# Patient Record
Sex: FEMALE | Race: WHITE | Employment: FULL TIME | URBAN - METROPOLITAN AREA
[De-identification: names, ages, dates, MRNs, and addresses within clinical notes are randomized per-mention and may not be internally consistent; named-entity substitution may affect disease eponyms.]

---

## 2020-04-08 ENCOUNTER — INITIAL PRENATAL (OUTPATIENT)
Dept: OBGYN CLINIC | Age: 35
End: 2020-04-08
Payer: COMMERCIAL

## 2020-04-08 VITALS
TEMPERATURE: 99.1 F | DIASTOLIC BLOOD PRESSURE: 69 MMHG | BODY MASS INDEX: 35.13 KG/M2 | SYSTOLIC BLOOD PRESSURE: 107 MMHG | WEIGHT: 211.13 LBS | HEART RATE: 103 BPM

## 2020-04-08 PROBLEM — Z3A.21 21 WEEKS GESTATION OF PREGNANCY: Status: ACTIVE | Noted: 2020-04-08

## 2020-04-08 PROCEDURE — 81002 URINALYSIS NONAUTO W/O SCOPE: CPT | Performed by: OBSTETRICS & GYNECOLOGY

## 2020-04-08 PROCEDURE — 99213 OFFICE O/P EST LOW 20 MIN: CPT | Performed by: OBSTETRICS & GYNECOLOGY

## 2020-04-08 PROCEDURE — 99999 PR OFFICE/OUTPT VISIT,PROCEDURE ONLY: CPT | Performed by: OBSTETRICS & GYNECOLOGY

## 2020-04-08 PROCEDURE — 76811 OB US DETAILED SNGL FETUS: CPT | Performed by: OBSTETRICS & GYNECOLOGY

## 2020-05-08 ENCOUNTER — TELEPHONE (OUTPATIENT)
Dept: OBGYN CLINIC | Age: 35
End: 2020-05-08

## 2020-06-01 ENCOUNTER — ROUTINE PRENATAL (OUTPATIENT)
Dept: OBGYN CLINIC | Age: 35
End: 2020-06-01
Payer: COMMERCIAL

## 2020-06-01 VITALS
BODY MASS INDEX: 36.86 KG/M2 | DIASTOLIC BLOOD PRESSURE: 75 MMHG | HEART RATE: 104 BPM | SYSTOLIC BLOOD PRESSURE: 125 MMHG | TEMPERATURE: 97.5 F | WEIGHT: 221.5 LBS

## 2020-06-01 PROBLEM — Z3A.28 28 WEEKS GESTATION OF PREGNANCY: Status: ACTIVE | Noted: 2020-06-01

## 2020-06-01 PROBLEM — O44.03 PLACENTA PREVIA ANTEPARTUM IN THIRD TRIMESTER: Status: ACTIVE | Noted: 2020-06-01

## 2020-06-01 PROBLEM — Z3A.21 21 WEEKS GESTATION OF PREGNANCY: Status: RESOLVED | Noted: 2020-04-08 | Resolved: 2020-06-01

## 2020-06-01 PROCEDURE — 76817 TRANSVAGINAL US OBSTETRIC: CPT | Performed by: OBSTETRICS & GYNECOLOGY

## 2020-06-01 PROCEDURE — 76816 OB US FOLLOW-UP PER FETUS: CPT | Performed by: OBSTETRICS & GYNECOLOGY

## 2020-06-01 PROCEDURE — 99241 PR OFFICE CONSULTATION NEW/ESTAB PATIENT 15 MIN: CPT | Performed by: OBSTETRICS & GYNECOLOGY

## 2020-06-01 PROCEDURE — 99213 OFFICE O/P EST LOW 20 MIN: CPT | Performed by: OBSTETRICS & GYNECOLOGY

## 2020-06-01 ASSESSMENT — ENCOUNTER SYMPTOMS
RESPIRATORY NEGATIVE: 1
GASTROINTESTINAL NEGATIVE: 1
EYES NEGATIVE: 1

## 2020-06-01 NOTE — PROGRESS NOTES
MATERNAL-FETAL MEDICINE CONSULT    Referring/Requesting Provider: Doe Lilly MD       HISTORY OF PRESENT ILLNESS:   Patient is a 37 y.o. [id number] at 28w3d who presents for consultation regarding placenta previa. She denies contractions or vaginal bleeding. 3 prior term vaginal deliveries. She is currently not working as her OB took her off from work. She overall feels well without concerns. OB History    Para Term  AB Living   5 3 3 0 1 3   SAB TAB Ectopic Molar Multiple Live Births   0 0 0   0 3      # Outcome Date GA Lbr [place]/2nd Weight Sex Delivery Anes PTL Lv   5 Current            4 Term 02/18/15 39w2d  7 lb 1 oz (3.204 kg) M Vag-Spont  N ERIKA   3 AB  6w0d          2 Term 12 37w0d  6 lb 4 oz (2.835 kg) F Vag-Spont EPI N ERIKA      Birth Comments: Induced labor due to low fluid levels   1 Term 05/10/09 40w5d  6 lb 13 oz (3.09 kg) F Vag-Spont EPI N ERIKA       Past Medical History:   Diagnosis Date    Abnormal Pap smear     Depression     Post partum depression with her last preagnancy as well.  n othing recent    Postpartum depression        Past Surgical History:   Procedure Laterality Date    INDUCED   2014       Family History   Problem Relation Age of Onset    Heart Attack Paternal Grandfather     Heart Attack Father     Hypertension Father     Cancer Mother        Current Outpatient Medications on File Prior to Visit   Medication Sig Dispense Refill    Prenatal Vit-Fe Fumarate-FA (PRENATAL 1 PLUS 1 PO) Take  by mouth. No current facility-administered medications on file prior to visit. Allergies   Allergen Reactions    Phenergan [Promethazine Hcl]        Review of Systems   Constitutional: Negative. HENT: Negative. Eyes: Negative. Respiratory: Negative. Cardiovascular: Negative. Gastrointestinal: Negative. Endocrine: Negative.     Genitourinary: Negative for difficulty urinating, pelvic pain, vaginal bleeding, vaginal discharge and vaginal pain. Gravid   Musculoskeletal: Negative. Skin: Negative. Psychiatric/Behavioral: Negative. PHYSICAL EXAM:  VITAL SIGNS: /75   Pulse 104   Temp 97.5 °F (36.4 °C) (Oral)   Wt 221 lb 8 oz (100.5 kg)   LMP 11/15/2019   BMI 36.86 kg/m²   AAOx2, NAD  Resp effort normal    IMAGING:  See report    LABS:  None      IMPRESSION AND RECOMMENDATIONS:  Trupti Joe is a 37 y.o. [id number] at 28w3d with  Patient Active Problem List    Diagnosis Date Noted    28 weeks gestation of pregnancy 2020    Placenta previa antepartum in third trimester 2020     Overview Note:      Diagnosis of placenta previa was reviewed with Trupti Joe.  Recommend pelvic rest (no intercourse, no digital cervical exam).  Bleeding precautions reviewed.   delivery between 39 0/7  and 37 6/7 weeks gestation is recommended for stable placenta previa.  High-risk pregnancy 2012    Depression      Overview Note:     Post partum depression with her last preagnancy as well. Follow up with MFM in 4 weeks to reassess fetal biometry and placental location. The total patient time of the visit was 15 minutes, of which was greater than 50% of the time was spent counseling and coordinating care.

## 2020-06-01 NOTE — PATIENT INSTRUCTIONS
is just below the rib cage and above the waist on either side of the back. ? Blood in your urine. Watch closely for changes in your health, and be sure to contact your doctor if:  · You have vaginal discharge that smells bad. · You have skin changes, such as:  ? A rash. ? Itching. ? Yellow color to your skin. · You have other concerns about your pregnancy. If you have labor signs at 37 weeks or more  If you have signs of labor at 37 weeks or more, your doctor may tell you to call when your labor becomes more active. Symptoms of active labor include:  · Contractions that are regular. · Contractions that are less than 5 minutes apart. · Contractions that are hard to talk through. Follow-up care is a key part of your treatment and safety. Be sure to make and go to all appointments, and call your doctor if you are having problems. It's also a good idea to know your test results and keep a list of the medicines you take. Where can you learn more? Go to https://[email or web address].[email or web address]. org and sign in to your Hangtime account. Enter  in the Greysox box to learn more about \"Learning About When to Call Your Doctor During Pregnancy (After 20 Weeks). \"     If you do not have an account, please click on the \"Sign Up Now\" link. Current as of: February 11, 2020               Content Version: 12.5  © 4036-4089 Healthwise, Incorporated. Care instructions adapted under license by [facility] ([facility]). If you have questions about a medical condition or this instruction, always ask your healthcare professional. [place] [address] any warranty or liability for your use of this information. Patient Education        Counting Your Baby's Kicks: Care Instructions  Your Care Instructions     Counting your baby's kicks is one way your doctor can tell that your baby is healthy. Most women--especially in a first pregnancy--feel their baby move for the first time between 16 and 22 weeks.

## 2020-06-01 NOTE — LETTER
[address] [facility] FETAL MEDICINE  [address]   Dept: [phone]  Loc: [phone]  Nazario Gibbs DO                                           [facility] Consultation Letter     20     Breanne Dumont MD     Re: Patient: Gareth Bales  YOB: 1985    MR Number: [id number] Date of Visit: 2020     Dear Dr. Bisi Velazquez had the pleasure of seeing your patient, Gareth Bales, in consultation in the [facility] Fetal Medicine Department of [facility] ([facility]). MATERNAL-FETAL MEDICINE CONSULT    Referring/Requesting Provider: Breanne Dumont MD       HISTORY OF PRESENT ILLNESS:   Patient is a 37 y.o. [id number] at 28w3d who presents for consultation regarding placenta previa. She denies contractions or vaginal bleeding. 3 prior term vaginal deliveries. She is currently not working as her OB took her off from work. She overall feels well without concerns.     OB History    Para Term  AB Living   5 3 3 0 1 3   SAB TAB Ectopic Molar Multiple Live Births   0 0 0   0 3      # Outcome Date GA Lbr Everton/2nd Weight Sex Delivery Anes PTL Lv   5 Current            4 Term 02/18/15 39w2d  7 lb 1 oz (3.204 kg) M Vag-Spont  N ERIKA   3 AB  6w0d          2 Term 12 37w0d  6 lb 4 oz (2.835 kg) F Vag-Spont EPI N ERIKA      Birth Comments: Induced labor due to low fluid levels   1 Term 05/10/09 40w5d  6 lb 13 oz (3.09 kg) F Vag-Spont EPI N ERIKA       Past Medical History:   Diagnosis Date    Abnormal Pap smear     Depression     Post partum depression with her last preagnancy as well.  n othing recent    Postpartum depression        Past Surgical History:   Procedure Laterality Date    INDUCED   2014       Family History   Problem Relation Age of Onset    Heart Attack Paternal Grandfather     Heart Attack Father     Hypertension Father     Cancer Mother        Current Outpatient Medications on File Prior to Visit   Medication Sig Dispense Refill  Prenatal Vit-Fe Fumarate-FA (PRENATAL 1 PLUS 1 PO) Take  by mouth. No current facility-administered medications on file prior to visit. Allergies   Allergen Reactions    Phenergan [Promethazine Hcl]        Review of Systems   Constitutional: Negative. HENT: Negative. Eyes: Negative. Respiratory: Negative. Cardiovascular: Negative. Gastrointestinal: Negative. Endocrine: Negative. Genitourinary: Negative for difficulty urinating, pelvic pain, vaginal bleeding, vaginal discharge and vaginal pain. Gravid   Musculoskeletal: Negative. Skin: Negative. Psychiatric/Behavioral: Negative. PHYSICAL EXAM:  VITAL SIGNS: /75   Pulse 104   Temp 97.5 °F (36.4 °C) (Oral)   Wt 221 lb 8 oz (100.5 kg)   LMP 11/15/2019   BMI 36.86 kg/m²    AAOx2, NAD  Resp effort normal    IMAGING:  See report    LABS:  None      IMPRESSION AND RECOMMENDATIONS:  Ashley Caicedo is a 37 y.o. [id number] at 28w3d with  Patient Active Problem List    Diagnosis Date Noted    28 weeks gestation of pregnancy 2020    Placenta previa antepartum in third trimester 2020     Overview Note:     ? Diagnosis of placenta previa was reviewed with Nesteffrakesh . ? Recommend pelvic rest (no intercourse, no digital cervical exam). ? Bleeding precautions reviewed. ?  delivery between 36 0/7  and 37 6/7 weeks gestation is recommended for stable placenta previa.  High-risk pregnancy 2012    Depression      Overview Note:     Post partum depression with her last preagnancy as well. Follow up with MFM in 4 weeks to reassess fetal biometry and placental location. The total patient time of the visit was 15 minutes, of which was greater than 50% of the time was spent counseling and coordinating care. Thank you for allowing us to participate in the care of your patient.  Should you or the family have any questions or concerns, please do not hesitate to contact us.     Sincerely,    Ryann Bobby, DO

## 2020-07-20 ENCOUNTER — ROUTINE PRENATAL (OUTPATIENT)
Dept: OBGYN CLINIC | Age: 35
End: 2020-07-20
Payer: COMMERCIAL

## 2020-07-20 VITALS
WEIGHT: 233 LBS | DIASTOLIC BLOOD PRESSURE: 79 MMHG | TEMPERATURE: 98.2 F | BODY MASS INDEX: 38.77 KG/M2 | HEART RATE: 97 BPM | SYSTOLIC BLOOD PRESSURE: 115 MMHG

## 2020-07-20 PROCEDURE — 76819 FETAL BIOPHYS PROFIL W/O NST: CPT | Performed by: OBSTETRICS & GYNECOLOGY

## 2020-07-20 PROCEDURE — 99213 OFFICE O/P EST LOW 20 MIN: CPT | Performed by: OBSTETRICS & GYNECOLOGY

## 2020-07-20 PROCEDURE — 76816 OB US FOLLOW-UP PER FETUS: CPT | Performed by: OBSTETRICS & GYNECOLOGY

## 2020-07-20 PROCEDURE — 81002 URINALYSIS NONAUTO W/O SCOPE: CPT | Performed by: OBSTETRICS & GYNECOLOGY

## 2020-07-20 PROCEDURE — 76817 TRANSVAGINAL US OBSTETRIC: CPT | Performed by: OBSTETRICS & GYNECOLOGY

## 2020-07-20 NOTE — PROGRESS NOTES
[facility] FETAL MEDICINE  [address] [place] [address].  [facility] [place], [place]. [address]  Ph: [phone]    Fax: [phone]   July 20, 2020    RE: Selena Lay  [birth year]  Dear Dr. Renee Cox: Thank you for sending   Ms. Murillo   for consultation and ultrasound  in our office   on  7.20.2020. REASON FOR CONSULTATION:    Low Lying placenta/ resolving previa, Hx of adherent placenta x 2-; Sleep apnea , Advanced Maternal Age    Her chart was reviewed, her medical, surgical , and OBG history was examined along with any supporting documents available to me .  Her recent clinical visits and notes were reviewed.  Her recent laboratory and pathology  testing was reviewed  Review of Systems :  cant sleep- feel terrible    CONSTITUTIONAL : No fever, no chills    HEENT : No headache, no visual changes, no sore throat    GASTROINTESTINAL : No N/V, no D/C, no abdominal pain    GENITOURINARY : No dysuria, no vaginal bleeding or fluid leaking or discharge    She reports good fetal movement   PHYSICAL EXAMINATION:   General Appearance: Healthy looking, alert , no acute distress.  Eyes: No pallor, no icterus, no photophobia.  Back: No CVA tenderness.  Abdomen: Soft, non-tender.  Extremities: 3+ pretibial pitting edema    An ultrasound evaluation was done today. Please refer to the enclosed copy of the ultrasound report for further detailed information. ULTRASOUND IMPRESSION:    ? Within developmental and technical limits of ultrasound assessment,   ? Vertex @ 35w3d  ? Active, responsive baby. The amniotic fluid volume appears normal.   ? The fetus is measuring appropriate for gestational age. ? There has been good interval growth and development . ? Transvaginal views of placenta appears to be clear of cervix. Posterior, very low lying placenta   ? The biophysical profile is reassuring with a score of 8/8.   ? Patient notes fetal movement, no cramping or contraction.  No undue tenderness or distress during exam.   DISCUSSION : Overall favorable report today.  The risk of bleeding increases as the distance between the placental edge and internal os decreases and if vasa previa is present.  The optimal route for delivery of pregnancies where the distance between the placental edge and internal os is 0 to 20 mm is debatable. In these cases, the fetal head may tamponade the adjacent placenta, thus preventing hemorrhage.  If a trial of labor with a low-lying placenta is attempted, the facility should have the ability to perform an emergency  delivery.  The frequency of emergency  delivery due to hemorrhage was 45 % @ (0-10mm) , 14% @11-20mm, and 10 % >20mm, respectively.   delivery at 36+0 to 37+6 weeks in pregnancies with uncomplicated placenta previa, in agreement with recommendations of the [employer] of Obstetricians and Gynecologists and the Society for Maternal-Fetal Medicine    RECOMMENDATIONS:  1. The patient is to continue to follow with you in your office for ongoing obstetric care. 2. In light of very low lying placenta with previa concerns noted earlier- recommend planned  @ 36w. 3. Recommend anticipated possible hemorrhage precautions- with instrumentation, consents, Type and cross, medications available. 4. She is to call if she has any problems or questions prior to her next visit. 5. Further evaluation and management will be dependent on her clinical presentation and the results of her testing. Once again, thank you for allowing us to participate in the care of this patient and if we can be of any further assistance to you, please do not hesitate to contact us. Sincerely,      Houston Courser, MD  I spent 16 minutes with the patient of which greater than 50% of the time was used to  the patient, discuss complications and problems related to her pregnancy, or coordinating her care.  I answered all of her questions to her

## 2020-07-23 LAB
GLUCOSE URINE, POC: NORMAL
PROTEIN UA: NEGATIVE

## 2023-02-20 PROBLEM — E66.01 CLASS 2 SEVERE OBESITY DUE TO EXCESS CALORIES WITH SERIOUS COMORBIDITY AND BODY MASS INDEX (BMI) OF 39.0 TO 39.9 IN ADULT (MULTI): Status: ACTIVE | Noted: 2023-02-20

## 2023-02-20 PROBLEM — F17.210 CIGARETTE NICOTINE DEPENDENCE WITHOUT COMPLICATION: Status: ACTIVE | Noted: 2023-02-20

## 2023-02-20 PROBLEM — E55.9 VITAMIN D DEFICIENCY: Status: ACTIVE | Noted: 2023-02-20

## 2023-02-20 PROBLEM — R53.83 FATIGUE: Status: ACTIVE | Noted: 2023-02-20

## 2023-02-20 PROBLEM — E78.5 DYSLIPIDEMIA: Status: ACTIVE | Noted: 2023-02-20

## 2023-02-20 PROBLEM — E04.9 ENLARGED THYROID: Status: ACTIVE | Noted: 2023-02-20

## 2023-02-20 PROBLEM — R53.82 CHRONIC FATIGUE: Status: ACTIVE | Noted: 2023-02-20

## 2023-02-20 PROBLEM — F41.9 ANXIETY: Status: ACTIVE | Noted: 2023-02-20

## 2023-02-20 PROBLEM — G47.33 OSA (OBSTRUCTIVE SLEEP APNEA): Status: ACTIVE | Noted: 2023-02-20

## 2023-02-20 RX ORDER — DULAGLUTIDE 0.75 MG/.5ML
0.75 INJECTION, SOLUTION SUBCUTANEOUS
COMMUNITY
End: 2023-03-14

## 2023-02-20 RX ORDER — BUPROPION HYDROCHLORIDE 200 MG/1
300 TABLET, EXTENDED RELEASE ORAL 2 TIMES DAILY
COMMUNITY

## 2023-03-14 ENCOUNTER — PROCEDURE VISIT (OUTPATIENT)
Dept: PRIMARY CARE | Facility: CLINIC | Age: 38
End: 2023-03-14
Payer: COMMERCIAL

## 2023-03-14 VITALS
WEIGHT: 230.4 LBS | DIASTOLIC BLOOD PRESSURE: 79 MMHG | OXYGEN SATURATION: 98 % | HEART RATE: 108 BPM | HEIGHT: 65 IN | RESPIRATION RATE: 20 BRPM | SYSTOLIC BLOOD PRESSURE: 115 MMHG | BODY MASS INDEX: 38.39 KG/M2

## 2023-03-14 DIAGNOSIS — L91.8 SKIN TAGS, MULTIPLE ACQUIRED: ICD-10-CM

## 2023-03-14 DIAGNOSIS — R53.82 CHRONIC FATIGUE: ICD-10-CM

## 2023-03-14 DIAGNOSIS — E55.9 VITAMIN D DEFICIENCY: ICD-10-CM

## 2023-03-14 DIAGNOSIS — E78.5 DYSLIPIDEMIA: Primary | ICD-10-CM

## 2023-03-14 DIAGNOSIS — F41.9 ANXIETY: ICD-10-CM

## 2023-03-14 DIAGNOSIS — D72.828 OTHER ELEVATED WHITE BLOOD CELL (WBC) COUNT: ICD-10-CM

## 2023-03-14 DIAGNOSIS — E04.9 ENLARGED THYROID: ICD-10-CM

## 2023-03-14 DIAGNOSIS — E66.01 CLASS 2 SEVERE OBESITY DUE TO EXCESS CALORIES WITH SERIOUS COMORBIDITY AND BODY MASS INDEX (BMI) OF 38.0 TO 38.9 IN ADULT (MULTI): ICD-10-CM

## 2023-03-14 DIAGNOSIS — G47.33 OSA (OBSTRUCTIVE SLEEP APNEA): ICD-10-CM

## 2023-03-14 PROCEDURE — 99214 OFFICE O/P EST MOD 30 MIN: CPT | Performed by: FAMILY MEDICINE

## 2023-03-14 PROCEDURE — 11200 RMVL SKIN TAGS UP TO&INC 15: CPT | Performed by: FAMILY MEDICINE

## 2023-03-14 RX ORDER — UBIDECARENONE 75 MG
1000 CAPSULE ORAL DAILY
Qty: 60 TABLET | Refills: 2 | Status: SHIPPED | OUTPATIENT
Start: 2023-03-14 | End: 2023-06-08

## 2023-03-14 RX ORDER — DULAGLUTIDE 1.5 MG/.5ML
1.5 INJECTION, SOLUTION SUBCUTANEOUS
Qty: 4 EACH | Refills: 0 | Status: SHIPPED | OUTPATIENT
Start: 2023-03-14 | End: 2023-04-14

## 2023-03-14 RX ORDER — ERGOCALCIFEROL 1.25 MG/1
50000 CAPSULE ORAL
Qty: 4 CAPSULE | Refills: 2 | Status: SHIPPED | OUTPATIENT
Start: 2023-03-14 | End: 2023-05-16 | Stop reason: ALTCHOICE

## 2023-03-14 ASSESSMENT — PAIN SCALES - GENERAL: PAINLEVEL: 0-NO PAIN

## 2023-03-14 NOTE — PROGRESS NOTES
Subjective     Pam Purcell is a 37 y.o. female who presents for No chief complaint on file..      HPI  The patient is a 37 year-old female presenting to the clinic for a skin tag removal and to discuss medications.    Follow-up.  Have reviewed lab results.  Educated on dyslipidemia and diet exercise.  Complaining feeling tired.  Advised on diet exercise.  History of anxiety.  Denies depression.  Denies suicidal.  Denies homicidal.  Keep up on the specialist.  Educated on vitamin D deficiency.  Educated on obesity and diet exercise.  Advised to lose weight.  Has been watching diet has been exercising.  Has been trying to lose weight.  I have reviewed ultrasound results and blood work results.  Discussed about enlarged thyroid.  We will continue monitor.  Educated elevated blood cell count.  Does not wish to go see the hematologist we will continue monitor.  Educated on obstructive sleep apnea.  Sleep hygiene.  Has multiple skin tags around the neck.  14 skin tags were noted.  After she gave verbal consent I have proceeded with skin tag removal.  Under aseptic under complete aseptic precautions skin tags were removed with iris scissors.  She had tolerated procedure very well.  Postprocedure instructions were given.  Bleeding was scanty and controlled with the silver nitrate cautery        Review of Systems  Review of systems    General.  Denies fever.  Denies chills.    HEENT denies nasal congestion.  Denies sinus pressure.    Respiratory.  Denies cough.  Denies shortness of breath.    Cardiovascular.  Denies chest pain.  Denies heart palpitations.  Denies shortness of breath.    Gastrointestinal.  Denies nausea vomiting diarrhea.  Denies abdominal pain.    Genitourinary denies burning urination.  Denies frequent urination.  Denies flank pain.  Denies blood in the urine.  Denies abnormal vaginal discharge.    Neurology.  Denies tingling numbness but denies weakness.  Denies headache.  Denies blurred  "vision.    Musculoskeletal.  Denies body aches.  Denies joint pains.  Denies muscle aches.  Denies muscle weakness    Endocrinology.  Denies cold intolerance.  Denies hot intolerance.    Psychiatric.  Denies depression.  Denies anxiety.  Denies suicidal.  Denies homicidal.  Skin.  Dictated as above    Objective       3/14/2023     2:25 PM   Vitals   Systolic 115   Diastolic 79   Heart Rate 108   Resp 20   Height (in) 1.651 m (5' 5\")   Weight (lb) 230.4   BMI 38.34 kg/m2   BSA (m2) 2.19 m2       Physical Exam  General.  Not in distress.  HEENT normocephalic anicteric sclerae.  Neck soft supple noted thyromegaly.  No carotid bruit.  Lungs are clear.  Heart regular.  Abdomen soft nontender nondistended bowel sounds are positive.  Extremities no clubbing cyanosis or edema.  Psychiatric.  Has good eye contact.  No crying spells noted.  Speech was normal.  Denies depression.  Denies suicidal.  Denies homicidal.  Skin exam.  Multiple skin tags noted.  Multiple skin tags noted around the neck  Component      Latest Ref Rng 2/15/2023   WBC      4.4 - 11.3 x10E9/L 12.3 (H)    RBC      4.00 - 5.20 x10E12/L 5.20    HEMOGLOBIN      12.0 - 16.0 g/dL 12.8    HEMATOCRIT      36.0 - 46.0 % 41.1    MCV      80 - 100 fL 79 (L)    MCHC      32.0 - 36.0 g/dL 31.1 (L)    Platelets      150 - 450 x10E9/L 364    RED CELL DISTRIBUTION WIDTH      11.5 - 14.5 % 13.9    Neutrophils %      40.0 - 80.0 % 65.9    Immature Granulocytes %, Automated      0.0 - 0.9 % 0.4    Lymphocytes %      13.0 - 44.0 % 25.4    Monocytes %      2.0 - 10.0 % 6.4    Eosinophils %      0.0 - 6.0 % 1.1    Basophils %      0.0 - 2.0 % 0.8    Neutrophils Absolute      1.20 - 7.70 x10E9/L 8.07 (H)    Lymphocytes Absolute      1.20 - 4.80 x10E9/L 3.11    Monocytes Absolute      0.10 - 1.00 x10E9/L 0.79    Eosinophils Absolute      0.00 - 0.70 x10E9/L 0.14    Basophils Absolute      0.00 - 0.10 x10E9/L 0.10    GLUCOSE      74 - 99 mg/dL 65 (L)    SODIUM      136 - 145 " mmol/L 137    POTASSIUM      3.5 - 5.3 mmol/L 3.9    CHLORIDE      98 - 107 mmol/L 98    Bicarbonate      21 - 32 mmol/L 27    Anion Gap      10 - 20 mmol/L 16    Blood Urea Nitrogen      6 - 23 mg/dL 10    Creatinine      0.50 - 1.05 mg/dL 0.84    GFR Female      >90 mL/min/1.73m2 90    Calcium      8.6 - 10.3 mg/dL 9.5    Albumin      3.4 - 5.0 g/dL 4.7    Alkaline Phosphatase      33 - 110 U/L 74    Total Protein      6.4 - 8.2 g/dL 7.1    AST      9 - 39 U/L 18    Bilirubin Total      0.0 - 1.2 mg/dL 0.6    ALT      7 - 45 U/L 19    Color, Urine CANCELED    Appearance, Urine CANCELED    Specific Gravity, Urine CANCELED    pH, Urine CANCELED    Protein, Urine CANCELED    Glucose, Urine CANCELED    Blood, Urine CANCELED    Ketones, Urine CANCELED    Bilirubin, Urine CANCELED    Urobilinogen, Urine CANCELED    Nitrite, Urine CANCELED    Leukocyte Esterase, Urine CANCELED    Ascorbic Acid      mg/dL CANCELED    CHOLESTEROL      0 - 199 mg/dL 168    HDL CHOLESTEROL      mg/dL 49.7    Cholesterol/HDL Ratio 3.4    LDL      0 - 99 mg/dL 101 (H)    VLDL      0 - 40 mg/dL 17    TRIGLYCERIDES      0 - 149 mg/dL 85    Thyroid Stimulating Hormone      0.44 - 3.98 mIU/L 3.26    Vitamin D, 25-Hydroxy, Total      ng/mL 13 !    Vitamin B12      211 - 911 pg/mL 298       Legend:  (H) High  (L) Low  ! Abnormal  Assessment/Plan   1.  Dyslipidemia.  Educated on diet exercise.  Reviewed lab results.  We will continue monitor.    2.  Fatigue.  Educated on diet exercise we will continue monitor.    3.  Anxiety.  Counseled for anxiety and stress.  Denies depression.  Denies suicidal.  Denies homicidal.  Keep appoint with a specialist.    4.  Vitamin D deficiency.  Educated on vitamin D deficiency.  We will continue monitor.    5.  Obesity.  Educated on diet exercise.  Advised lose weight we will continue monitor    6.  Enlarged thyroid.  Dictated as above.    7.  Elevated WBC count.  Dictated as above.    8.  Obstructive sleep apnea.   Dictated as above.    9.  Multiple skin tags removal.  Dictated as above    Problem List Items Addressed This Visit          Nervous    JUAN (obstructive sleep apnea)       Endocrine/Metabolic    Enlarged thyroid    Vitamin D deficiency    Relevant Medications    ergocalciferol (Vitamin D-2) 1.25 MG (79332 UT) capsule    Other Relevant Orders    Vitamin D 25-Hydroxy,Total       Other    Anxiety    Chronic fatigue    Relevant Medications    cyanocobalamin (Vitamin B-12) 500 mcg tablet    Dyslipidemia - Primary     Other Visit Diagnoses       Class 2 severe obesity due to excess calories with serious comorbidity and body mass index (BMI) of 38.0 to 38.9 in adult (CMS/[facility])        Relevant Medications    dulaglutide (Trulicity) 1.5 mg/0.5 mL pen injector    Other elevated white blood cell (WBC) count        Relevant Orders    CBC and Auto Differential    Skin tags, multiple acquired                Scribe Attestation  By signing my name below, IDevika Scribe   attest that this documentation has been prepared under the direction and in the presence of Rony Whalen MD.

## 2023-04-06 ENCOUNTER — LAB (OUTPATIENT)
Dept: LAB | Facility: LAB | Age: 38
End: 2023-04-06
Payer: COMMERCIAL

## 2023-04-06 DIAGNOSIS — E55.9 VITAMIN D DEFICIENCY: ICD-10-CM

## 2023-04-06 DIAGNOSIS — D72.828 OTHER ELEVATED WHITE BLOOD CELL (WBC) COUNT: ICD-10-CM

## 2023-04-06 LAB
BASOPHILS (10*3/UL) IN BLOOD BY AUTOMATED COUNT: 0.08 X10E9/L (ref 0–0.1)
BASOPHILS/100 LEUKOCYTES IN BLOOD BY AUTOMATED COUNT: 0.8 % (ref 0–2)
EOSINOPHILS (10*3/UL) IN BLOOD BY AUTOMATED COUNT: 0.05 X10E9/L (ref 0–0.7)
EOSINOPHILS/100 LEUKOCYTES IN BLOOD BY AUTOMATED COUNT: 0.5 % (ref 0–6)
ERYTHROCYTE DISTRIBUTION WIDTH (RATIO) BY AUTOMATED COUNT: 14.2 % (ref 11.5–14.5)
ERYTHROCYTE MEAN CORPUSCULAR HEMOGLOBIN CONCENTRATION (G/DL) BY AUTOMATED: 32.9 G/DL (ref 32–36)
ERYTHROCYTE MEAN CORPUSCULAR VOLUME (FL) BY AUTOMATED COUNT: 79 FL (ref 80–100)
ERYTHROCYTES (10*6/UL) IN BLOOD BY AUTOMATED COUNT: 5.41 X10E12/L (ref 4–5.2)
HEMATOCRIT (%) IN BLOOD BY AUTOMATED COUNT: 42.6 % (ref 36–46)
HEMOGLOBIN (G/DL) IN BLOOD: 14 G/DL (ref 12–16)
IMMATURE GRANULOCYTES/100 LEUKOCYTES IN BLOOD BY AUTOMATED COUNT: 0.2 % (ref 0–0.9)
LEUKOCYTES (10*3/UL) IN BLOOD BY AUTOMATED COUNT: 9.5 X10E9/L (ref 4.4–11.3)
LYMPHOCYTES (10*3/UL) IN BLOOD BY AUTOMATED COUNT: 2.52 X10E9/L (ref 1.2–4.8)
LYMPHOCYTES/100 LEUKOCYTES IN BLOOD BY AUTOMATED COUNT: 26.4 % (ref 13–44)
MONOCYTES (10*3/UL) IN BLOOD BY AUTOMATED COUNT: 0.62 X10E9/L (ref 0.1–1)
MONOCYTES/100 LEUKOCYTES IN BLOOD BY AUTOMATED COUNT: 6.5 % (ref 2–10)
NEUTROPHILS (10*3/UL) IN BLOOD BY AUTOMATED COUNT: 6.24 X10E9/L (ref 1.2–7.7)
NEUTROPHILS/100 LEUKOCYTES IN BLOOD BY AUTOMATED COUNT: 65.6 % (ref 40–80)
PLATELETS (10*3/UL) IN BLOOD AUTOMATED COUNT: 355 X10E9/L (ref 150–450)

## 2023-04-06 PROCEDURE — 36415 COLL VENOUS BLD VENIPUNCTURE: CPT

## 2023-04-06 PROCEDURE — 85025 COMPLETE CBC W/AUTO DIFF WBC: CPT

## 2023-04-06 PROCEDURE — 82306 VITAMIN D 25 HYDROXY: CPT

## 2023-04-07 LAB — CALCIDIOL (25 OH VITAMIN D3) (NG/ML) IN SER/PLAS: 38 NG/ML

## 2023-04-13 NOTE — PROGRESS NOTES
Subjective     Pam Purcell is a 37 y.o. female who presents for No chief complaint on file..       HPI  The patient is a 37 year-old female presenting to the clinic for follow up on lab results. I have reviewed results from her most recent blood work with her.   Component      Latest Ref Rng 2/15/2023   WBC      4.4 - 11.3 x10E9/L 12.3 (H)    RBC      4.00 - 5.20 x10E12/L 5.20    HEMOGLOBIN      12.0 - 16.0 g/dL 12.8    HEMATOCRIT      36.0 - 46.0 % 41.1    MCV      80 - 100 fL 79 (L)    MCHC      32.0 - 36.0 g/dL 31.1 (L)    Platelets      150 - 450 x10E9/L 364    RED CELL DISTRIBUTION WIDTH      11.5 - 14.5 % 13.9    Neutrophils %      40.0 - 80.0 % 65.9    Immature Granulocytes %, Automated      0.0 - 0.9 % 0.4    Lymphocytes %      13.0 - 44.0 % 25.4    Monocytes %      2.0 - 10.0 % 6.4    Eosinophils %      0.0 - 6.0 % 1.1    Basophils %      0.0 - 2.0 % 0.8    Neutrophils Absolute      1.20 - 7.70 x10E9/L 8.07 (H)    Lymphocytes Absolute      1.20 - 4.80 x10E9/L 3.11    Monocytes Absolute      0.10 - 1.00 x10E9/L 0.79    Eosinophils Absolute      0.00 - 0.70 x10E9/L 0.14    Basophils Absolute      0.00 - 0.10 x10E9/L 0.10    GLUCOSE      74 - 99 mg/dL 65 (L)    SODIUM      136 - 145 mmol/L 137    POTASSIUM      3.5 - 5.3 mmol/L 3.9    CHLORIDE      98 - 107 mmol/L 98    Bicarbonate      21 - 32 mmol/L 27    Anion Gap      10 - 20 mmol/L 16    Blood Urea Nitrogen      6 - 23 mg/dL 10    Creatinine      0.50 - 1.05 mg/dL 0.84    GFR Female      >90 mL/min/1.73m2 90    Calcium      8.6 - 10.3 mg/dL 9.5    Albumin      3.4 - 5.0 g/dL 4.7    Alkaline Phosphatase      33 - 110 U/L 74    Total Protein      6.4 - 8.2 g/dL 7.1    AST      9 - 39 U/L 18    Bilirubin Total      0.0 - 1.2 mg/dL 0.6    ALT      7 - 45 U/L 19    Color, Urine CANCELED    Appearance, Urine CANCELED    Specific Gravity, Urine CANCELED    pH, Urine CANCELED    Protein, Urine CANCELED    Glucose, Urine CANCELED    Blood, Urine CANCELED     Ketones, Urine CANCELED    Bilirubin, Urine CANCELED    Urobilinogen, Urine CANCELED    Nitrite, Urine CANCELED    Leukocyte Esterase, Urine CANCELED    Ascorbic Acid      mg/dL CANCELED    CHOLESTEROL      0 - 199 mg/dL 168    HDL CHOLESTEROL      mg/dL 49.7    Cholesterol/HDL Ratio 3.4    LDL      0 - 99 mg/dL 101 (H)    VLDL      0 - 40 mg/dL 17    TRIGLYCERIDES      0 - 149 mg/dL 85    Thyroid Stimulating Hormone      0.44 - 3.98 mIU/L 3.26    Vitamin D, 25-Hydroxy, Total      ng/mL 13 !    Vitamin B12      211 - 911 pg/mL 298       Legend:  (H) High  (L) Low  ! Abnormal  Follow-up.  Educated on dyslipidemia and diet and exercise.  Reviewed lab results.  Complaining feeling tired but advised on diet and exercise.  Vitamin D deficiency.  Educated on obstructive sleep apnea and sleep hygiene.  Educated on obesity and diet and exercise.  Started on medication.  Advised to lose weight.  Explained adverse proximal medication.    Educated on tobacco cessation.  History of enlarged thyroid.  Had ultrasound done in the past.  Educated on elevated WBC.    White blood cell count was improved.              Review of Systems      Review of systems  General.  Denies fever.  Denies chills.  HEENT denies nasal congestion.  Denies sinus pressure.  Respiratory.  Denies cough.  Denies shortness of breath.    Cardiovascular.  Denies chest pain.  Denies heart palpitations.  Denies shortness of breath.    Gastrointestinal.  Denies nausea vomiting diarrhea.  Denies abdominal pain.    Genitourinary denies burning urination.  Denies frequent urination.  Denies flank pain.  Denies blood in the urine.  Denies abnormal vaginal discharge.    Neurology.  Denies tingling numbness but denies weakness.  Denies headache.  Denies blurred vision.    Musculoskeletal.  Denies body aches.  Denies joint pains.  Denies muscle aches.  Denies muscle weakness    Endocrinology.  Denies cold intolerance.  Denies hot intolerance.    Psychiatric.  Denies  "depression.  Denies anxiety.  Denies suicidal.  Denies homicidal.                Objective       4/14/2023 1:24 PM   /76   BP Location Left arm   Patient Position Sitting   BP Cuff Size Large adult   Pulse 94   SpO2 98 %   Weight 103 kg (226 lb)   Height 1.651 m (5' 5\")   Pain Score 0-No pain    Other Vitals   BMI 37.61 kg/m2   BSA 2.17 m2   Menstrual status Having periods   OB/Gyn status reviewed 4/14/2023   Tobacco   Smoking status Former   Used Cigarettes   Packs/day 0.25   Counseling given? Not Answered   Smokeless status Never   Reviewed 4/14/2023           Physical Exam      General.  Not in distress.  HEENT normocephalic anicteric sclerae.  Neck soft supple no thyromegaly.   Lungs are clear.  Heart regular.  Abdomen soft nontender nondistended bowel sounds are positive.  Extremities no clubbing cyanosis or edema.  Psychiatric.  Has good eye contact.  No crying spells noted.  Speech was normal.  Denies depression.  Denies suicidal.  Denies homicidal.    Assessment/Plan     1.  Dyslipidemia.  Educated on diet and exercise.  We will continue monitor.  2.  Fatigue.  Dictated as above    3.  Vitamin D deficiency.  Educated on vitamin deficiency we will continue monitor.    4.  Obstructive sleep apnea.  Educated on sleep hygiene.  Continue current management    5.  Obesity.  Educated on diet exercise.  Advised to lose weight.  Explained adverse effects of medication.  6.  Nicotine dependence/addiction.  Educated on tobacco cessation.  Does not wish to take nicotine patch or gum or inhaler.  Does not wish to take the Chantix.  7.  Enlarged thyroid.  Dictated as above    8.  Elevated white blood cell count.  Improved.                        Problem List Items Addressed This Visit    None  Scribe Attestation  By signing my name below, IDevika Scribe   attest that this documentation has been prepared under the direction and in the presence of Rony Whalen MD.    "

## 2023-04-14 ENCOUNTER — OFFICE VISIT (OUTPATIENT)
Dept: PRIMARY CARE | Facility: CLINIC | Age: 38
End: 2023-04-14
Payer: COMMERCIAL

## 2023-04-14 VITALS
BODY MASS INDEX: 37.65 KG/M2 | SYSTOLIC BLOOD PRESSURE: 115 MMHG | WEIGHT: 226 LBS | OXYGEN SATURATION: 98 % | HEART RATE: 94 BPM | HEIGHT: 65 IN | DIASTOLIC BLOOD PRESSURE: 76 MMHG

## 2023-04-14 DIAGNOSIS — E55.9 VITAMIN D DEFICIENCY: ICD-10-CM

## 2023-04-14 DIAGNOSIS — E78.5 DYSLIPIDEMIA: Primary | ICD-10-CM

## 2023-04-14 DIAGNOSIS — E66.01 CLASS 2 SEVERE OBESITY DUE TO EXCESS CALORIES WITH SERIOUS COMORBIDITY AND BODY MASS INDEX (BMI) OF 37.0 TO 37.9 IN ADULT (MULTI): ICD-10-CM

## 2023-04-14 DIAGNOSIS — E04.9 ENLARGED THYROID: ICD-10-CM

## 2023-04-14 DIAGNOSIS — D72.828 OTHER ELEVATED WHITE BLOOD CELL (WBC) COUNT: ICD-10-CM

## 2023-04-14 DIAGNOSIS — G47.33 OSA (OBSTRUCTIVE SLEEP APNEA): ICD-10-CM

## 2023-04-14 DIAGNOSIS — F17.210 CIGARETTE NICOTINE DEPENDENCE WITHOUT COMPLICATION: ICD-10-CM

## 2023-04-14 DIAGNOSIS — R53.83 OTHER FATIGUE: ICD-10-CM

## 2023-04-14 PROCEDURE — 3008F BODY MASS INDEX DOCD: CPT | Performed by: FAMILY MEDICINE

## 2023-04-14 PROCEDURE — 99214 OFFICE O/P EST MOD 30 MIN: CPT | Performed by: FAMILY MEDICINE

## 2023-04-14 PROCEDURE — 1036F TOBACCO NON-USER: CPT | Performed by: FAMILY MEDICINE

## 2023-04-14 ASSESSMENT — PAIN SCALES - GENERAL: PAINLEVEL: 0-NO PAIN

## 2023-05-15 NOTE — PROGRESS NOTES
Subjective     Pam Purcell is a 37 y.o. female who presents for Follow-up (1 month follow up meds).      HPI  The patient is a 37 year-old female presenting to the clinic for a 1 month follow up  .   Patient reports myalgia of the right foot.  Referral placed with Podiatry 5/16/2023.  Educated the patient on obesity, low-fat diet and exercise. Encouraged the patient to lose weight. Educated on Dyslipidemia.  Educated on Vitamin D deficiency.  I have educated the patient on fatigue. The patient denies restless leg syndrome and denies obstructive sleep apnea. Discussed medication.  Take as directed.  Follow up in office.   Educated on dyslipidemia and diet and exercise.  Educated on obesity and diet and exercise and advised to lose weight.  Complaining feeling tired.  Advised on diet and exercise.  Educated on tobacco cessation.  Educated on vitamin D deficiency.  Complaining right foot pain current pain scale 7-8 out of 10.  Throbbing pain.  Denies trauma.  Denies injury.          Review of Systems  Review of systems:    General:  Denies fever.  Denies chills.    HEENT: Denies nasal congestion.  Denies sinus pressure.    Respiratory:  Denies cough.  Denies shortness of breath.    Cardiovascular:  Denies chest pain.  Denies heart palpitations.  Denies shortness of breath.    Gastrointestinal:  Denies nausea vomiting diarrhea.  Denies abdominal pain.    Genitourinary: Denies burning urination.  Denies frequent urination.  Denies flank pain.  Denies blood in the urine.  Denies abnormal vaginal discharge.    Neurology:  Denies tingling numbness but denies weakness.  Denies headache.  Denies blurred vision.    Musculoskeletal:  Denies body aches.  Denies joint pains.  Denies muscle aches.  Denies muscle weakness    Endocrinology:  Denies cold intolerance.  Denies hot intolerance.    Psychiatric:  Denies depression.  Denies anxiety.  Denies suicidal.  Denies homicidal.  Musculoskeletal.  Dictated as  "above  Objective   /79 (BP Location: Left arm, Patient Position: Sitting, BP Cuff Size: Large adult)   Pulse 92   Ht 1.651 m (5' 5\")   Wt 101 kg (223 lb)   SpO2 98%   BMI 37.11 kg/m²          Physical Exam  General.  Not in distress.  HEENT normocephalic anicteric sclerae.  Neck soft supple no thyromegaly.    Lungs are clear.  Heart regular.  Abdomen soft nontender nondistended bowel sounds are positive.  Extremities no clubbing cyanosis or edema.  Psychiatric.  Has good eye contact.  No crying spells noted.  Speech was normal.  Denies depression.  Denies suicidal.  Denies homicidal.  Musculoskeletal.  Right foot exam.  Tenderness noted on the anterior aspect of the right foot.    Assessment/Plan     1.  Dyslipidemia.  Educated on diet and exercise.  Advised to lose weight.  We will continue monitor    2.  Obesity.  Educated on diet and exercise.  Explained adverse effects medication.    3.  Fatigue.  Educated on diet exercise.  Advised to lose weight.  We will continue monitor weight loss    4.  Nicotine dependence/addiction.  Educated on tobacco cessation.  Does not wish to take nicotine patch or gum or inhaler.  Does not wish to take the Chantix.    5.  Vitamin D deficiency.  Educated on vitamin D deficiency.  We will continue monitor.    6.  Right foot pain.  Educated on foot exercises.  Explained adverse effects of medication.   .  We will follow-up on the x-ray.                                      Problem List Items Addressed This Visit          Endocrine/Metabolic    Class 2 severe obesity due to excess calories with serious comorbidity and body mass index (BMI) of 37.0 to 37.9 in adult (CMS/HCC)    Relevant Medications    dulaglutide (Trulicity) 4.5 mg/0.5 mL pen injector    Vitamin D deficiency       Other    Cigarette nicotine dependence without complication    Dyslipidemia - Primary    Fatigue     Other Visit Diagnoses       Right foot pain        Relevant Medications    meloxicam (Mobic) 7.5 " mg tablet    Other Relevant Orders    Referral to Podiatry            Scribe Attestation  By signing my name below, I, Madeline Burgess   attest that this documentation has been prepared under the direction and in the presence of Rony Whalen MD.

## 2023-05-16 ENCOUNTER — OFFICE VISIT (OUTPATIENT)
Dept: PRIMARY CARE | Facility: CLINIC | Age: 38
End: 2023-05-16
Payer: COMMERCIAL

## 2023-05-16 VITALS
BODY MASS INDEX: 37.15 KG/M2 | OXYGEN SATURATION: 98 % | DIASTOLIC BLOOD PRESSURE: 79 MMHG | HEART RATE: 92 BPM | WEIGHT: 223 LBS | HEIGHT: 65 IN | SYSTOLIC BLOOD PRESSURE: 118 MMHG

## 2023-05-16 DIAGNOSIS — F17.210 CIGARETTE NICOTINE DEPENDENCE WITHOUT COMPLICATION: ICD-10-CM

## 2023-05-16 DIAGNOSIS — E66.01 CLASS 2 SEVERE OBESITY DUE TO EXCESS CALORIES WITH SERIOUS COMORBIDITY AND BODY MASS INDEX (BMI) OF 37.0 TO 37.9 IN ADULT (MULTI): ICD-10-CM

## 2023-05-16 DIAGNOSIS — R53.83 OTHER FATIGUE: ICD-10-CM

## 2023-05-16 DIAGNOSIS — M79.671 RIGHT FOOT PAIN: ICD-10-CM

## 2023-05-16 DIAGNOSIS — E55.9 VITAMIN D DEFICIENCY: ICD-10-CM

## 2023-05-16 DIAGNOSIS — E78.5 DYSLIPIDEMIA: Primary | ICD-10-CM

## 2023-05-16 PROCEDURE — 99214 OFFICE O/P EST MOD 30 MIN: CPT | Performed by: FAMILY MEDICINE

## 2023-05-16 PROCEDURE — 3008F BODY MASS INDEX DOCD: CPT | Performed by: FAMILY MEDICINE

## 2023-05-16 PROCEDURE — 1036F TOBACCO NON-USER: CPT | Performed by: FAMILY MEDICINE

## 2023-05-16 RX ORDER — MELOXICAM 7.5 MG/1
7.5 TABLET ORAL DAILY
Qty: 30 TABLET | Refills: 0 | Status: SHIPPED | OUTPATIENT
Start: 2023-05-16 | End: 2023-06-08

## 2023-05-16 RX ORDER — DULAGLUTIDE 4.5 MG/.5ML
4.5 INJECTION, SOLUTION SUBCUTANEOUS
Qty: 2 ML | Refills: 0 | Status: SHIPPED | OUTPATIENT
Start: 2023-05-16 | End: 2023-06-08

## 2023-05-16 RX ORDER — DESVENLAFAXINE SUCCINATE 25 MG/1
25 TABLET, EXTENDED RELEASE ORAL DAILY
COMMUNITY
Start: 2023-05-09

## 2023-05-16 ASSESSMENT — PAIN SCALES - GENERAL: PAINLEVEL: 4

## 2023-06-08 DIAGNOSIS — R53.82 CHRONIC FATIGUE: ICD-10-CM

## 2023-06-08 DIAGNOSIS — E66.01 CLASS 2 SEVERE OBESITY DUE TO EXCESS CALORIES WITH SERIOUS COMORBIDITY AND BODY MASS INDEX (BMI) OF 37.0 TO 37.9 IN ADULT (MULTI): ICD-10-CM

## 2023-06-08 DIAGNOSIS — M79.671 RIGHT FOOT PAIN: ICD-10-CM

## 2023-06-08 RX ORDER — DULAGLUTIDE 4.5 MG/.5ML
INJECTION, SOLUTION SUBCUTANEOUS
Qty: 2 ML | Refills: 0 | Status: SHIPPED | OUTPATIENT
Start: 2023-06-08 | End: 2023-07-03

## 2023-06-08 RX ORDER — MELOXICAM 7.5 MG/1
TABLET ORAL
Qty: 30 TABLET | Refills: 0 | Status: SHIPPED | OUTPATIENT
Start: 2023-06-08 | End: 2023-07-03

## 2023-06-08 RX ORDER — LANOLIN ALCOHOL/MO/W.PET/CERES
CREAM (GRAM) TOPICAL
Qty: 30 TABLET | Refills: 0 | Status: SHIPPED | OUTPATIENT
Start: 2023-06-08